# Patient Record
Sex: MALE | Race: BLACK OR AFRICAN AMERICAN | NOT HISPANIC OR LATINO | Employment: UNEMPLOYED | URBAN - METROPOLITAN AREA
[De-identification: names, ages, dates, MRNs, and addresses within clinical notes are randomized per-mention and may not be internally consistent; named-entity substitution may affect disease eponyms.]

---

## 2017-10-16 ENCOUNTER — OFFICE VISIT (OUTPATIENT)
Dept: PODIATRY | Facility: CLINIC | Age: 22
End: 2017-10-16
Payer: MEDICAID

## 2017-10-16 VITALS
DIASTOLIC BLOOD PRESSURE: 78 MMHG | HEIGHT: 69 IN | WEIGHT: 200 LBS | SYSTOLIC BLOOD PRESSURE: 116 MMHG | BODY MASS INDEX: 29.62 KG/M2

## 2017-10-16 DIAGNOSIS — R07.9 CHEST PAIN, UNSPECIFIED TYPE: ICD-10-CM

## 2017-10-16 DIAGNOSIS — M20.12 HAV (HALLUX ABDUCTO VALGUS), LEFT: Primary | ICD-10-CM

## 2017-10-16 DIAGNOSIS — M21.42 PES PLANUS OF BOTH FEET: ICD-10-CM

## 2017-10-16 DIAGNOSIS — M79.672 FOOT PAIN, LEFT: ICD-10-CM

## 2017-10-16 DIAGNOSIS — M21.41 PES PLANUS OF BOTH FEET: ICD-10-CM

## 2017-10-16 DIAGNOSIS — R10.32 GROIN PAIN, LEFT: ICD-10-CM

## 2017-10-16 PROCEDURE — 99203 OFFICE O/P NEW LOW 30 MIN: CPT | Mod: PBBFAC,PO | Performed by: PODIATRIST

## 2017-10-16 PROCEDURE — 99999 PR PBB SHADOW E&M-NEW PATIENT-LVL III: CPT | Mod: PBBFAC,,, | Performed by: PODIATRIST

## 2017-10-16 PROCEDURE — 99203 OFFICE O/P NEW LOW 30 MIN: CPT | Mod: S$PBB,,, | Performed by: PODIATRIST

## 2017-10-16 RX ORDER — MELOXICAM 15 MG/1
15 TABLET ORAL DAILY
Qty: 30 TABLET | Refills: 1 | Status: SHIPPED | OUTPATIENT
Start: 2017-10-16

## 2017-10-16 RX ORDER — MELOXICAM 15 MG/1
15 TABLET ORAL DAILY
Status: CANCELLED | OUTPATIENT
Start: 2017-10-16

## 2017-10-16 NOTE — PROGRESS NOTES
Subjective:      Patient ID: Ethan Castle is a 22 y.o. male.    Chief Complaint: Foot Problem (left foot bunion pain Pcp Dr. Sanchez  08/18/2017)    Ethan is a 22 y.o. male who presents to the podiatry clinic  with complaint of  left foot pain. Onset of the symptoms was several years ago. Precipitating event: dropped weight on foot years ago. Current symptoms include: pain wtih walking, running, exercising. Aggravating factors: any weight bearing, running and walking. Symptoms have progressed to a point and plateaued. Patient has had no prior foot problems. Evaluation to date: none. Treatment to date: none. Patients rates pain 8/10 on pain scale.    Also states his PCP has sent him here to have his groin pain and chest pain examined. I advised him that this is a Podiatry clinic and we do not treat anything above the knee. He would like to be referred to have these things examined.     Review of Systems   Constitution: Negative for chills and fever.   Cardiovascular: Positive for chest pain. Negative for claudication and leg swelling.   Respiratory: Negative for cough and shortness of breath.    Skin: Negative.    Musculoskeletal: Positive for joint pain.   Gastrointestinal: Negative for nausea and vomiting.   Genitourinary: Positive for pelvic pain.   Neurological: Negative for numbness and paresthesias.   Psychiatric/Behavioral: Negative for altered mental status.           Objective:      Physical Exam   Constitutional: He is oriented to person, place, and time. He appears well-developed and well-nourished.   HENT:   Head: Normocephalic.   Cardiovascular: Intact distal pulses.    Pulses:       Dorsalis pedis pulses are 2+ on the right side, and 2+ on the left side.        Posterior tibial pulses are 2+ on the right side, and 2+ on the left side.   CRT < 3 sec to tips of toes. No edema noted to b/l LE. No vericosities noted to b/l LEs.      Pulmonary/Chest: No respiratory distress.   Musculoskeletal:    Gastrocnemius equinus noted to b/l ankles with decreased DF noted on exam. MMT 5/5 in DF/PF/Inv/Ev resistance with no reproduction of pain in any direction. Passive range of motion of ankle and pedal joints is painless. Adequate pedal joint ROM.     Semi-reducible hammertoe contractures noted to toes 2-4 b/l-asymptomatic. HAV, moderate, non trackbound noted L with mild medial bony prominence at 1st met head--pain on palpation and ROM.     Hypermobility noted to 1st ray b/l with near complete collapse of medial longitudinal arch b/l with loading.       Neurological: He is alert and oriented to person, place, and time. He has normal strength. No sensory deficit.   Light touch, proprioception, and sharp/dull sensation are all intact bilaterally.    Skin: Skin is warm, dry and intact. No erythema.   No open lesions, lacerations or wounds noted. Nails are normotrophic to R 1-5 and L 1-5. Interdigital spaces clean, dry and intact b/l. No erythema noted to b/l foot. Skin texture normal. Pedal hair normal. Skin temperature normal b/l foot.      Psychiatric: He has a normal mood and affect. His behavior is normal. Judgment and thought content normal.   Vitals reviewed.            Assessment:       Encounter Diagnoses   Name Primary?    Hav (hallux abducto valgus), left Yes    Pes planus of both feet     Foot pain, left     Groin pain, left     Chest pain, unspecified type          Plan:       Ethan was seen today for foot problem.    Diagnoses and all orders for this visit:    Hav (hallux abducto valgus), left    Pes planus of both feet    Foot pain, left    Groin pain, left  -     Ambulatory Referral to Urology    Chest pain, unspecified type  -     Ambulatory Referral to Cardiology    Other orders  -     Cancel: meloxicam (MOBIC) 15 MG tablet; Take 1 tablet (15 mg total) by mouth once daily.  -     meloxicam (MOBIC) 15 MG tablet; Take 1 tablet (15 mg total) by mouth once daily.      I counseled the patient on his  conditions, their implications and medical management.    Advised to stretch calf muscles multiple times daily for increased flexibility of ankles and to help decrease forefoot pressure over time. Illustrative stretching exercise handout provided.     Recommended Spenco Orthotic arch supports from [employer] to be worn in supportive shoes daily.     Long discussion with patient regarding appropriate, supportive and comfortable shoes. Recommended New Balance, Asics, Mckoy with adequate arch supports to alleviate abnormal pressure and improve stability of foot while walking. Avoid flat shoes and barefoot walking as these will exacerbate or worsen symptoms.     Metatarsal gel cushion pad dispensed and applied to R foot for additional cushioning of met heads.    Mobic 15mg once daily as needed for pain.     Referral to Cardiology for unspecified chest pain.     Referral to Urology for unspecified pelvic pain.     RTC as needed to Podiatry clinic if pain does not improve with above treatments.

## 2017-10-16 NOTE — LETTER
October 16, 2017      Danay Sanchez, RANDI  [address]  [facility] [address]           [facility]  [address]  [place] [address]  Phone: [phone]          Patient: Ethan Castle   MR Number: [id number]   YOB: 1995   Date of Visit: 10/16/2017       Dear Danay Sanchez:    Thank you for referring Ethan Castle to me for evaluation. Attached you will find relevant portions of my assessment and plan of care.    If you have questions, please do not hesitate to call me. I look forward to following Ethan Castle along with you.    Sincerely,    Valarie Delacruz DPM    Enclosure  CC:  No Recipients    If you would like to receive this communication electronically, please contact [email or web address] or [phone] to request more information on [email or web address] Link access.    For providers and/or their staff who would like to refer a patient to [facility], please contact us through our one-stop-shop provider referral line, [facility], at [phone].    If you feel you have received this communication in error or would no longer like to receive these types of communications, please e-mail [email or web address]

## 2017-10-16 NOTE — PATIENT INSTRUCTIONS
New Balance, Asics, Mckoy sneakers    Spenco Orthotic arch supports from [employer] to be worn in supportive shoes daily.     Take Mobic 15mg once daily as needed for pain.

## 2017-12-11 ENCOUNTER — OFFICE VISIT (OUTPATIENT)
Dept: UROLOGY | Facility: CLINIC | Age: 22
End: 2017-12-11
Payer: MEDICAID

## 2017-12-11 VITALS
HEIGHT: 69 IN | SYSTOLIC BLOOD PRESSURE: 132 MMHG | HEART RATE: 72 BPM | WEIGHT: 205.25 LBS | RESPIRATION RATE: 14 BRPM | BODY MASS INDEX: 30.4 KG/M2 | DIASTOLIC BLOOD PRESSURE: 78 MMHG

## 2017-12-11 DIAGNOSIS — M79.652 LEFT THIGH PAIN: Primary | ICD-10-CM

## 2017-12-11 PROCEDURE — 99999 PR PBB SHADOW E&M-EST. PATIENT-LVL III: CPT | Mod: PBBFAC,,, | Performed by: UROLOGY

## 2017-12-11 PROCEDURE — 99203 OFFICE O/P NEW LOW 30 MIN: CPT | Mod: S$PBB,,, | Performed by: UROLOGY

## 2017-12-11 PROCEDURE — 99213 OFFICE O/P EST LOW 20 MIN: CPT | Mod: PBBFAC | Performed by: UROLOGY

## 2017-12-11 NOTE — LETTER
December 11, 2017      Valarie Delacruz DPM  [address] [facility] [address]           [facility]  [address] [facility] [address]  [facility] [address]  Phone: [phone]          Patient: Ethan Castle   MR Number: [id number]   YOB: 1995   Date of Visit: 12/11/2017       Dear Dr. Valarie Delacruz:    Thank you for referring Ethan Castle to me for evaluation. Attached you will find relevant portions of my assessment and plan of care.    If you have questions, please do not hesitate to call me. I look forward to following Ethan Castle along with you.    Sincerely,    Shanna Neumann MD    Enclosure  CC:  No Recipients    If you would like to receive this communication electronically, please contact [email or web address] or [phone] to request more information on [email or web address] Link access.    For providers and/or their staff who would like to refer a patient to [facility], please contact us through our one-stop-shop provider referral line, [facility] , at [phone].    If you feel you have received this communication in error or would no longer like to receive these types of communications, please e-mail [email or web address]

## 2017-12-11 NOTE — PROGRESS NOTES
"Subjective:       Ethan Castle is a 22 y.o. male who is a new patient who was referred by Dr Delacruz for evaluation of groin pain.      He reports pain in L groin with exercise or physical activity x 2-3 years - stable. He describes the pain more in his leg rather than  area. He denies feeling bulge in inguinal area. Denies LUTS. He noted his L testicle used to be bigger than R - never sought care. Pain relieved with rest. He also reports intermittent LBP. He feels pain started with lifting something heavy. He is s/p hip surgery as a child ("abby put in my L leg").        The following portions of the patient's history were reviewed and updated as appropriate: allergies, current medications, past family history, past medical history, past social history, past surgical history and problem list.    Review of Systems  Constitutional: no fever or chills  ENT: no nasal congestion or sore throat  Respiratory: no cough or shortness of breath  Cardiovascular: no chest pain or palpitations  Gastrointestinal: no nausea or vomiting, tolerating diet  Genitourinary: as per HPI  Hematologic/Lymphatic: no easy bruising or lymphadenopathy  Musculoskeletal: no arthralgias or myalgias  Skin: no rashes or lesions  Neurological: no seizures or tremors  Behavioral/Psych: no auditory or visual hallucinations       Objective:    Vitals: /78   Pulse 72   Resp 14   Ht 5' 9" (1.753 m)   Wt 93.1 kg (205 lb 4 oz)   BMI 30.31 kg/m²     Physical Exam   General: well developed, well nourished in no acute distress  Head: normocephalic, atraumatic  Neck: supple, trachea midline, no obvious enlargement of thyroid  HEENT: EOMI, mucus membranes moist, sclera anicteric, no hearing impairment  Lungs: symmetric expansion, non-labored breathing  Cardiovascular: regular rate and rhythm, normal pulses  Abdomen: soft, non tender, non distended, no palpable masses, no hepatosplenomegaly, no hernias, bladder nonpalpable. No CVA " tenderness.  Musculoskeletal: no peripheral edema, normal ROM in bilateral upper and lower extremities  Lymphatics: no cervical or inguinal lymphadenopathy  Skin: no rashes or lesions  Neuro: alert and oriented x 3, no gross deficits  Psych: normal judgment and insight, normal mood/affect and non-anxious  Genitourinary:   Penis - uncircumcised penis without plaques, lesions, or scarring.  Urethra - orthotopic location without stenosis.  Scrotum  - no lesions or rashes, no hydrocele or hernia.  Testes - descended bilaterally, symmetric without masses, non tender.  Epididymides - no cysts or lesions, no spermatocele, symmetric   LEESA: patient declined exam - not indicated      Lab Review   Urine analysis today in clinic shows +protein trace    No results found for: WBC, HGB, HCT, MCV, PLT  No results found for: CREATININE, BUN  No results found for: PSA  No results found for: PSADIAG    Imaging  NA       Assessment/Plan:      1. Left thigh pain    - Seems unrelated to  issues   - Prior orthopedic surgery on L leg   - Recommend f/u with ortho surgery as most likely able to help   - Consider Motrin x 2 weeks   - No need for HUGH at this time    - No hernia noted on exam       Follow up PRN

## 2020-11-06 ENCOUNTER — HOSPITAL ENCOUNTER (EMERGENCY)
Facility: HOSPITAL | Age: 25
Discharge: HOME | End: 2020-11-06
Attending: EMERGENCY MEDICINE
Payer: MEDICAID

## 2020-11-06 VITALS
HEART RATE: 88 BPM | TEMPERATURE: 98 F | DIASTOLIC BLOOD PRESSURE: 55 MMHG | RESPIRATION RATE: 20 BRPM | HEIGHT: 68 IN | BODY MASS INDEX: 27.28 KG/M2 | WEIGHT: 180 LBS | OXYGEN SATURATION: 97 % | SYSTOLIC BLOOD PRESSURE: 117 MMHG

## 2020-11-06 DIAGNOSIS — S01.81XA FACIAL LACERATION, INITIAL ENCOUNTER: Primary | ICD-10-CM

## 2020-11-06 PROCEDURE — 99282 EMERGENCY DEPT VISIT SF MDM: CPT | Mod: 25

## 2020-11-06 PROCEDURE — 12013 RPR F/E/E/N/L/M 2.6-5.0 CM: CPT

## 2020-11-06 PROCEDURE — 25000003 PHARM REV CODE 250: Performed by: PHYSICIAN ASSISTANT

## 2020-11-06 RX ORDER — LIDOCAINE HYDROCHLORIDE 10 MG/ML
10 INJECTION INFILTRATION; PERINEURAL
Status: COMPLETED | OUTPATIENT
Start: 2020-11-06 | End: 2020-11-06

## 2020-11-06 RX ADMIN — LIDOCAINE HYDROCHLORIDE 10 ML: 10 INJECTION, SOLUTION INFILTRATION; PERINEURAL at 10:11
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
## 2020-11-10 ENCOUNTER — HOSPITAL ENCOUNTER (EMERGENCY)
Facility: HOSPITAL | Age: 25
Discharge: HOME | End: 2020-11-10
Attending: EMERGENCY MEDICINE
Payer: MEDICAID

## 2020-11-10 VITALS
HEIGHT: 68 IN | TEMPERATURE: 98 F | HEART RATE: 62 BPM | WEIGHT: 180 LBS | OXYGEN SATURATION: 98 % | DIASTOLIC BLOOD PRESSURE: 62 MMHG | SYSTOLIC BLOOD PRESSURE: 127 MMHG | BODY MASS INDEX: 27.28 KG/M2 | RESPIRATION RATE: 16 BRPM

## 2020-11-10 DIAGNOSIS — Z48.02 VISIT FOR SUTURE REMOVAL: Primary | ICD-10-CM

## 2020-11-10 DIAGNOSIS — M54.2 NECK PAIN: ICD-10-CM

## 2020-11-10 PROCEDURE — 99283 EMERGENCY DEPT VISIT LOW MDM: CPT

## 2020-11-10 RX ORDER — METHOCARBAMOL 500 MG/1
1000 TABLET, FILM COATED ORAL 3 TIMES DAILY PRN
Qty: 20 TABLET | Refills: 0 | Status: SHIPPED | OUTPATIENT
Start: 2020-11-10 | End: 2020-11-15
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
## 2020-11-11 NOTE — DISCHARGE INSTRUCTIONS
Continue with Neosporin to the wound twice daily.  Clean the wound gently with soap and water.  Ibuprofen or Tylenol as needed for pain.  Robaxin for stiffness/soreness. Be aware, this medication is sedating.  Do not mix with alcohol or any other sedating medications.  Do not drive or operate machinery when taking this medication.     Follow-up and establish care with a primary care provider for re-evaluation.    Return to this ED if the wound becomes red and warm, if worsening pain, if the wound begins to drain foul-smelling fluid, if you begin with fever.  Return to this ED if you begin with severe headache, if frequent vomiting, if any blurred vision or loss of vision, if worsening neck pain, if any other problems occur.
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
## 2024-11-22 ENCOUNTER — HOSPITAL ENCOUNTER (EMERGENCY)
Facility: HOSPITAL | Age: 29
Discharge: HOME | End: 2024-11-22
Attending: EMERGENCY MEDICINE
Payer: COMMERCIAL

## 2024-11-22 VITALS
DIASTOLIC BLOOD PRESSURE: 69 MMHG | OXYGEN SATURATION: 99 % | BODY MASS INDEX: 27.4 KG/M2 | SYSTOLIC BLOOD PRESSURE: 130 MMHG | HEART RATE: 98 BPM | TEMPERATURE: 99 F | RESPIRATION RATE: 18 BRPM | WEIGHT: 185 LBS | HEIGHT: 69 IN

## 2024-11-22 DIAGNOSIS — B34.9 VIRAL SYNDROME: Primary | ICD-10-CM

## 2024-11-22 LAB
CTP QC/QA: YES
CTP QC/QA: YES
POC MOLECULAR INFLUENZA A AGN: NEGATIVE
POC MOLECULAR INFLUENZA B AGN: NEGATIVE
SARS-COV-2 RDRP RESP QL NAA+PROBE: NEGATIVE

## 2024-11-22 PROCEDURE — 25000003 PHARM REV CODE 250

## 2024-11-22 PROCEDURE — 87502 INFLUENZA DNA AMP PROBE: CPT

## 2024-11-22 PROCEDURE — 87635 SARS-COV-2 COVID-19 AMP PRB: CPT

## 2024-11-22 PROCEDURE — 99283 EMERGENCY DEPT VISIT LOW MDM: CPT

## 2024-11-22 RX ORDER — BENZONATATE 100 MG/1
100 CAPSULE ORAL 3 TIMES DAILY PRN
Qty: 20 CAPSULE | Refills: 0 | Status: SHIPPED | OUTPATIENT
Start: 2024-11-22 | End: 2024-12-02

## 2024-11-22 RX ORDER — ACETAMINOPHEN 500 MG
1000 TABLET ORAL
Status: COMPLETED | OUTPATIENT
Start: 2024-11-22 | End: 2024-11-22

## 2024-11-22 RX ORDER — ACETAMINOPHEN 500 MG
500 TABLET ORAL EVERY 4 HOURS PRN
Qty: 30 TABLET | Refills: 0 | Status: SHIPPED | OUTPATIENT
Start: 2024-11-22

## 2024-11-22 RX ORDER — GUAIFENESIN 100 MG/5ML
200 SOLUTION ORAL EVERY 4 HOURS PRN
Qty: 118 ML | Refills: 0 | Status: SHIPPED | OUTPATIENT
Start: 2024-11-22 | End: 2024-12-02

## 2024-11-22 RX ORDER — IBUPROFEN 600 MG/1
600 TABLET ORAL EVERY 6 HOURS PRN
Qty: 30 TABLET | Refills: 0 | Status: SHIPPED | OUTPATIENT
Start: 2024-11-22

## 2024-11-22 RX ADMIN — ACETAMINOPHEN 1000 MG: 500 TABLET ORAL at 09:11

## 2024-11-22 NOTE — ED TRIAGE NOTES
Headache, body aches, dry cough, and fever off and on for past 2 days. Treating with OTC cold meds. Denies sick contacts. Presents awake, alert.

## 2024-11-22 NOTE — DISCHARGE INSTRUCTIONS

## 2024-11-22 NOTE — Clinical Note
"Ethan Patel" Tin was seen and treated in our emergency department on 11/22/2024.  He may return to work on 11/24/2024.       If you have any questions or concerns, please don't hesitate to call.      Joel Burnett PA-C"

## 2024-11-22 NOTE — ED PROVIDER NOTES
Encounter Date: 11/22/2024       History     Chief Complaint   Patient presents with    Headache     Pt to ER with reports of headache, cough, fever and back pain x 3 days     29-year-old male with no past medical history presents to ED for emergent evaluation of 3 day history of nonproductive cough, subjective fever, generalized myalgias, headache.  He denies any sick contacts.  He attempted TheraFlu with no relief.  He denies any sore throat, dysphagia, rhinorrhea, nasal congestion, abdominal pain, nausea, vomiting, diarrhea, dysuria, hematuria.  No other symptoms reported.    The history is provided by the patient. No  was used.     Review of patient's allergies indicates:  No Known Allergies  No past medical history on file.  Past Surgical History:   Procedure Laterality Date    JOINT REPLACEMENT Left 2005    Patient had hip replacement due to sports injury     No family history on file.  Social History     Tobacco Use    Smoking status: Never    Smokeless tobacco: Never   Substance Use Topics    Alcohol use: No    Drug use: No     Review of Systems   Constitutional:  Positive for fever. Negative for chills.   HENT:  Negative for congestion, ear pain, rhinorrhea, sore throat and trouble swallowing.    Eyes:  Negative for photophobia, pain, redness and visual disturbance.   Respiratory:  Positive for cough. Negative for shortness of breath.         (-) hemoptysis   Cardiovascular:  Negative for chest pain.   Gastrointestinal:  Negative for abdominal pain, diarrhea, nausea and vomiting.   Genitourinary:  Negative for decreased urine volume, difficulty urinating, dysuria, frequency, hematuria and urgency.   Musculoskeletal:  Positive for myalgias. Negative for back pain and neck pain.   Skin:  Negative for rash.   Neurological:  Positive for headaches. Negative for dizziness, tremors, seizures, syncope, facial asymmetry, speech difficulty, weakness, light-headedness and numbness.    Psychiatric/Behavioral:  Negative for confusion.        Physical Exam     Initial Vitals [11/22/24 0855]   BP Pulse Resp Temp SpO2   130/69 98 18 99.3 °F (37.4 °C) 99 %      MAP       --         Physical Exam    Nursing note and vitals reviewed.  Constitutional: He appears well-developed and well-nourished. He is not diaphoretic.  Non-toxic appearance. No distress.   HENT:   Head: Normocephalic and atraumatic.   Right Ear: Hearing, tympanic membrane, external ear and ear canal normal. Tympanic membrane is not perforated, not erythematous and not bulging.   Left Ear: Hearing, tympanic membrane, external ear and ear canal normal. Tympanic membrane is not perforated, not erythematous and not bulging.   Nose: Nose normal. Mouth/Throat: Uvula is midline and oropharynx is clear and moist.   Eyes: Conjunctivae and EOM are normal.   Neck: Neck supple.   Normal range of motion.   Full passive range of motion without pain.     Cardiovascular:            Pulses:       Radial pulses are 2+ on the right side and 2+ on the left side.   Pulmonary/Chest: Effort normal and breath sounds normal. No respiratory distress. He has no decreased breath sounds.   Abdominal: Abdomen is soft and flat. There is no abdominal tenderness.   No right CVA tenderness.  No left CVA tenderness. There is no rebound and no guarding.   Musculoskeletal:      Cervical back: Full passive range of motion without pain, normal range of motion and neck supple. No rigidity.     Neurological: He is alert. No cranial nerve deficit.   Neuro intact.  Strength and sensation intact to bilateral upper and lower extremities.   Skin: Skin is warm and dry. No rash noted.         ED Course   Procedures  Labs Reviewed   SARS-COV-2 RDRP GENE       Result Value    POC Rapid COVID Negative       Acceptable Yes     POCT INFLUENZA A/B MOLECULAR    POC Molecular Influenza A Ag Negative      POC Molecular Influenza B Ag Negative       Acceptable Yes             Imaging Results    None          Medications   acetaminophen tablet 1,000 mg (1,000 mg Oral Given 11/22/24 0921)     Medical Decision Making  This is a 29-year-old male with no past medical history presents to ED for emergent evaluation of 3 day history of nonproductive cough, subjective fever, generalized myalgias, headache.  He denies any sick contacts.  He attempted TheraFlu with no relief.  He denies any sore throat, dysphagia, rhinorrhea, nasal congestion, abdominal pain, nausea, vomiting, diarrhea, dysuria, hematuria.  No other symptoms reported.    On physical exam, patient is well-appearing and in no acute distress.  Nontoxic appearing.  Lungs are clear to auscultation bilaterally.  Abdomen is soft and nontender.  No guarding, rigidity, rebound.  2+ radial pulses bilaterally.  Posterior oropharynx is not erythematous.  No edema or exudate.  Uvula midline.  Bilateral tympanic membrane is normal.  No erythema, bulging, or perforations.  Neuro intact.  Strength and sensation intact bilateral upper and lower extremities. Full ROM of neck. No neck rigidity. Tylenol ordered.COVID and flu negative.  I believe patient's symptoms are viral in nature.  Will discharge patient on Tylenol, ibuprofen, Tessalon Perles, Robitussin.  Advised patient to drink lot of fluids upon discharge.  Urged prompt follow-up with PCP for further evaluation.    Strict return precautions given. I discussed with the patient/family the diagnosis, treatment plan, indications for return to the emergency department, and for expected follow-up. The patient/family verbalized an understanding. The patient/family is asked if there are any questions or concerns. We discuss the case, until all issues are addressed to the patient/family's satisfaction. Patient/family understands and is agreeable to the plan. Patient is stable and ready for discharge.      Amount and/or Complexity of Data Reviewed  Labs: ordered.    Risk  OTC  drugs.  Prescription drug management.                                      Clinical Impression:  Final diagnoses:  [B34.9] Viral syndrome (Primary)          ED Disposition Condition    Discharge Stable          ED Prescriptions       Medication Sig Dispense Start Date End Date Auth. Provider    acetaminophen (TYLENOL) 500 MG tablet Take 1 tablet (500 mg total) by mouth every 4 (four) hours as needed for Pain or Temperature greater than (100.5 or greater). 30 tablet 11/22/2024 -- Joel Burnett PA-C    ibuprofen (ADVIL,MOTRIN) 600 MG tablet Take 1 tablet (600 mg total) by mouth every 6 (six) hours as needed for Pain or Temperature greater than (100.5 or greater). 30 tablet 11/22/2024 -- Joel Burnett PA-C    guaiFENesin 100 mg/5 ml (ROBITUSSIN) 100 mg/5 mL syrup Take 10 mLs (200 mg total) by mouth every 4 (four) hours as needed for Cough. 118 mL 11/22/2024 12/2/2024 Joel Burnett PA-C    benzonatate (TESSALON) 100 MG capsule Take 1 capsule (100 mg total) by mouth 3 (three) times daily as needed for Cough. 20 capsule 11/22/2024 12/2/2024 Joel Burnett PA-C          Follow-up Information       Follow up With Specialties Details Why Contact Info    [facility] -  Schedule an appointment as soon as possible for a visit in 2 days for further evaluation [address] [place] [address]  [phone]      [facility] - Emergency Dept Emergency Medicine In 2 days If symptoms worsen [address]  [facility] [place] [id number] [phone]             Joel Burnett PA-C  11/22/24 6122